# Patient Record
Sex: FEMALE | Race: WHITE | NOT HISPANIC OR LATINO
[De-identification: names, ages, dates, MRNs, and addresses within clinical notes are randomized per-mention and may not be internally consistent; named-entity substitution may affect disease eponyms.]

---

## 2021-08-24 DIAGNOSIS — N60.12 DIFFUSE CYSTIC MASTOPATHY OF LEFT BREAST: ICD-10-CM

## 2021-08-24 DIAGNOSIS — N60.11 DIFFUSE CYSTIC MASTOPATHY OF LEFT BREAST: ICD-10-CM

## 2021-08-24 PROBLEM — Z00.00 ENCOUNTER FOR PREVENTIVE HEALTH EXAMINATION: Status: ACTIVE | Noted: 2021-08-24

## 2021-08-30 ENCOUNTER — APPOINTMENT (OUTPATIENT)
Dept: BREAST CENTER | Facility: CLINIC | Age: 81
End: 2021-08-30
Payer: MEDICARE

## 2021-08-30 VITALS
WEIGHT: 128 LBS | DIASTOLIC BLOOD PRESSURE: 73 MMHG | HEART RATE: 62 BPM | OXYGEN SATURATION: 98 % | HEIGHT: 64 IN | BODY MASS INDEX: 21.85 KG/M2 | SYSTOLIC BLOOD PRESSURE: 134 MMHG

## 2021-08-30 DIAGNOSIS — Z86.79 PERSONAL HISTORY OF OTHER DISEASES OF THE CIRCULATORY SYSTEM: ICD-10-CM

## 2021-08-30 DIAGNOSIS — Z12.31 ENCOUNTER FOR SCREENING MAMMOGRAM FOR MALIGNANT NEOPLASM OF BREAST: ICD-10-CM

## 2021-08-30 DIAGNOSIS — Z86.39 PERSONAL HISTORY OF OTHER ENDOCRINE, NUTRITIONAL AND METABOLIC DISEASE: ICD-10-CM

## 2021-08-30 DIAGNOSIS — Z86.000 PERSONAL HISTORY OF IN-SITU NEOPLASM OF BREAST: ICD-10-CM

## 2021-08-30 DIAGNOSIS — R92.2 INCONCLUSIVE MAMMOGRAM: ICD-10-CM

## 2021-08-30 PROCEDURE — 99213 OFFICE O/P EST LOW 20 MIN: CPT

## 2021-08-30 RX ORDER — AMLODIPINE BESYLATE 5 MG/1
TABLET ORAL
Refills: 0 | Status: ACTIVE | COMMUNITY

## 2021-08-30 RX ORDER — ROSUVASTATIN CALCIUM 5 MG/1
TABLET, FILM COATED ORAL
Refills: 0 | Status: ACTIVE | COMMUNITY

## 2021-08-30 RX ORDER — RALOXIFENE HYDROCHLORIDE 60 MG/1
TABLET ORAL
Refills: 0 | Status: ACTIVE | COMMUNITY

## 2021-08-30 NOTE — HISTORY OF PRESENT ILLNESS
[FreeTextEntry1] : Left breast LCIS\par \par Patient denies any breast masses or nipple discharge.  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  Patient has no family history of breast cancer and is never taken hormone replacement therapy.

## 2021-08-30 NOTE — PAST MEDICAL HISTORY
[Menarche Age ____] : age at menarche was [unfilled] [History of Hormone Replacement Treatment] : has a history of hormone replacement treatment [Approximately ___] : the LMP was approximately [unfilled] [Total Preg ___] : G[unfilled] [Live Births ___] : P[unfilled]  [Age At Live Birth ___] : Age at live birth: [unfilled]

## 2021-09-03 ENCOUNTER — NON-APPOINTMENT (OUTPATIENT)
Age: 81
End: 2021-09-03

## 2022-11-13 ENCOUNTER — RESULT REVIEW (OUTPATIENT)
Age: 82
End: 2022-11-13

## 2023-11-15 ENCOUNTER — APPOINTMENT (OUTPATIENT)
Dept: OPHTHALMOLOGY | Facility: CLINIC | Age: 83
End: 2023-11-15

## 2023-11-15 ENCOUNTER — OUTPATIENT (OUTPATIENT)
Dept: OUTPATIENT SERVICES | Facility: HOSPITAL | Age: 83
LOS: 1 days | End: 2023-11-15

## 2023-11-17 DIAGNOSIS — H26.492 OTHER SECONDARY CATARACT, LEFT EYE: ICD-10-CM

## 2024-11-20 PROBLEM — M79.645 PAIN OF FINGER OF LEFT HAND: Status: ACTIVE | Noted: 2024-11-20

## 2024-11-25 RX ORDER — ACETAMINOPHEN 500 MG
TABLET ORAL DAILY
COMMUNITY

## 2024-11-25 RX ORDER — RALOXIFENE HYDROCHLORIDE 60 MG/1
60 TABLET, FILM COATED ORAL DAILY
COMMUNITY

## 2024-11-25 RX ORDER — CANDESARTAN 8 MG/1
8 TABLET ORAL 2 TIMES DAILY
COMMUNITY

## 2024-11-25 RX ORDER — LANOLIN ALCOHOL/MO/W.PET/CERES
1000 CREAM (GRAM) TOPICAL DAILY
COMMUNITY

## 2024-11-25 RX ORDER — AMLODIPINE BESYLATE 5 MG/1
5 TABLET ORAL DAILY
COMMUNITY

## 2024-11-25 RX ORDER — LATANOPROST 50 UG/ML
1 SOLUTION/ DROPS OPHTHALMIC NIGHTLY
COMMUNITY

## 2024-12-03 ENCOUNTER — APPOINTMENT (OUTPATIENT)
Dept: OTHER | Facility: CLINIC | Age: 84
End: 2024-12-03
Payer: COMMERCIAL

## 2024-12-03 SDOH — ECONOMIC STABILITY: INCOME INSECURITY: HOW HARD IS IT FOR YOU TO PAY FOR THE VERY BASICS LIKE FOOD, HOUSING, MEDICAL CARE, AND HEATING?: NOT HARD AT ALL

## 2024-12-03 SDOH — ECONOMIC STABILITY: FOOD INSECURITY: WITHIN THE PAST 12 MONTHS, THE FOOD YOU BOUGHT JUST DIDN'T LAST AND YOU DIDN'T HAVE MONEY TO GET MORE.: NEVER TRUE

## 2024-12-03 SDOH — ECONOMIC STABILITY: HOUSING INSECURITY: IN THE PAST 12 MONTHS, HOW MANY TIMES HAVE YOU MOVED WHERE YOU WERE LIVING?: 0

## 2024-12-03 SDOH — ECONOMIC STABILITY: INCOME INSECURITY: IN THE LAST 12 MONTHS, WAS THERE A TIME WHEN YOU WERE NOT ABLE TO PAY THE MORTGAGE OR RENT ON TIME?: NO

## 2024-12-03 SDOH — HEALTH STABILITY: MENTAL HEALTH
STRESS IS WHEN SOMEONE FEELS TENSE, NERVOUS, ANXIOUS, OR CAN'T SLEEP AT NIGHT BECAUSE THEIR MIND IS TROUBLED. HOW STRESSED ARE YOU?: ONLY A LITTLE

## 2024-12-03 SDOH — HEALTH STABILITY: PHYSICAL HEALTH: ON AVERAGE, HOW MANY DAYS PER WEEK DO YOU ENGAGE IN MODERATE TO STRENUOUS EXERCISE (LIKE A BRISK WALK)?: 4 DAYS

## 2024-12-03 SDOH — ECONOMIC STABILITY: HOUSING INSECURITY: AT ANY TIME IN THE PAST 12 MONTHS, WERE YOU HOMELESS OR LIVING IN A SHELTER (INCLUDING NOW)?: NO

## 2024-12-03 SDOH — HEALTH STABILITY: PHYSICAL HEALTH
HOW OFTEN DO YOU NEED TO HAVE SOMEONE HELP YOU WHEN YOU READ INSTRUCTIONS, PAMPHLETS, OR OTHER WRITTEN MATERIAL FROM YOUR DOCTOR OR PHARMACY?: RARELY

## 2024-12-03 SDOH — HEALTH STABILITY: PHYSICAL HEALTH: ON AVERAGE, HOW MANY MINUTES DO YOU ENGAGE IN EXERCISE AT THIS LEVEL?: 40 MIN

## 2024-12-03 SDOH — ECONOMIC STABILITY: TRANSPORTATION INSECURITY
IN THE PAST 12 MONTHS, HAS THE LACK OF TRANSPORTATION KEPT YOU FROM MEDICAL APPOINTMENTS OR FROM GETTING MEDICATIONS?: NO

## 2024-12-03 SDOH — ECONOMIC STABILITY: TRANSPORTATION INSECURITY
IN THE PAST 12 MONTHS, HAS LACK OF TRANSPORTATION KEPT YOU FROM MEETINGS, WORK, OR FROM GETTING THINGS NEEDED FOR DAILY LIVING?: NO

## 2024-12-03 SDOH — ECONOMIC STABILITY: FOOD INSECURITY: WITHIN THE PAST 12 MONTHS, YOU WORRIED THAT YOUR FOOD WOULD RUN OUT BEFORE YOU GOT MONEY TO BUY MORE.: NEVER TRUE

## 2024-12-03 ASSESSMENT — SOCIAL DETERMINANTS OF HEALTH (SDOH)
IN THE PAST 12 MONTHS, HAS THE ELECTRIC, GAS, OIL, OR WATER COMPANY THREATENED TO SHUT OFF SERVICE IN YOUR HOME?: NO
HOW OFTEN DO YOU ATTENT MEETINGS OF THE CLUB OR ORGANIZATION YOU BELONG TO?: NEVER
HOW OFTEN DO YOU GET TOGETHER WITH FRIENDS OR RELATIVES?: MORE THAN THREE TIMES A WEEK
IN A TYPICAL WEEK, HOW MANY TIMES DO YOU TALK ON THE PHONE WITH FAMILY, FRIENDS, OR NEIGHBORS?: MORE THAN THREE TIMES A WEEK
DO YOU BELONG TO ANY CLUBS OR ORGANIZATIONS SUCH AS CHURCH GROUPS UNIONS, FRATERNAL OR ATHLETIC GROUPS, OR SCHOOL GROUPS?: NO
HOW OFTEN DO YOU GET TOGETHER WITH FRIENDS OR RELATIVES?: MORE THAN THREE TIMES A WEEK
HOW OFTEN DO YOU ATTEND CHURCH OR RELIGIOUS SERVICES?: 1 TO 4 TIMES PER YEAR
HOW OFTEN DO YOU ATTENT MEETINGS OF THE CLUB OR ORGANIZATION YOU BELONG TO?: NEVER
IN A TYPICAL WEEK, HOW MANY TIMES DO YOU TALK ON THE PHONE WITH FAMILY, FRIENDS, OR NEIGHBORS?: MORE THAN THREE TIMES A WEEK
IN THE PAST 12 MONTHS, HAS THE ELECTRIC, GAS, OIL, OR WATER COMPANY THREATENED TO SHUT OFF SERVICE IN YOUR HOME?: NO
DO YOU BELONG TO ANY CLUBS OR ORGANIZATIONS SUCH AS CHURCH GROUPS UNIONS, FRATERNAL OR ATHLETIC GROUPS, OR SCHOOL GROUPS?: NO
HOW OFTEN DO YOU ATTEND CHURCH OR RELIGIOUS SERVICES?: 1 TO 4 TIMES PER YEAR

## 2024-12-03 ASSESSMENT — ANXIETY QUESTIONNAIRES
7. FEELING AFRAID AS IF SOMETHING AWFUL MIGHT HAPPEN: NOT AT ALL
6. BECOMING EASILY ANNOYED OR IRRITABLE: NOT AT ALL
6. BECOMING EASILY ANNOYED OR IRRITABLE: NOT AT ALL
2. NOT BEING ABLE TO STOP OR CONTROL WORRYING: NOT AT ALL
4. TROUBLE RELAXING: NOT AT ALL
2. NOT BEING ABLE TO STOP OR CONTROL WORRYING: NOT AT ALL
3. WORRYING TOO MUCH ABOUT DIFFERENT THINGS: NOT AT ALL
7. FEELING AFRAID AS IF SOMETHING AWFUL MIGHT HAPPEN: NOT AT ALL
3. WORRYING TOO MUCH ABOUT DIFFERENT THINGS: NOT AT ALL
1. FEELING NERVOUS, ANXIOUS, OR ON EDGE: SEVERAL DAYS
IF YOU CHECKED OFF ANY PROBLEMS ON THIS QUESTIONNAIRE, HOW DIFFICULT HAVE THESE PROBLEMS MADE IT FOR YOU TO DO YOUR WORK, TAKE CARE OF THINGS AT HOME, OR GET ALONG WITH OTHER PEOPLE: SOMEWHAT DIFFICULT
IF YOU CHECKED OFF ANY PROBLEMS ON THIS QUESTIONNAIRE, HOW DIFFICULT HAVE THESE PROBLEMS MADE IT FOR YOU TO DO YOUR WORK, TAKE CARE OF THINGS AT HOME, OR GET ALONG WITH OTHER PEOPLE: SOMEWHAT DIFFICULT
5. BEING SO RESTLESS THAT IT IS HARD TO SIT STILL: NOT AT ALL
GAD7 TOTAL SCORE: 1
5. BEING SO RESTLESS THAT IT IS HARD TO SIT STILL: NOT AT ALL
1. FEELING NERVOUS, ANXIOUS, OR ON EDGE: SEVERAL DAYS
4. TROUBLE RELAXING: NOT AT ALL

## 2024-12-03 ASSESSMENT — SLEEP AND FATIGUE QUESTIONNAIRES
FATIGUE_CAUSES_FREQUENT_PROBLEMTS: 7 STRONGLY AGREE
FATIGUE_MOST_DISABILING_SYMPTOM: 7 STRONGLY AGREE
FATIGUE_INTERFERES_RESPONSIBILITIES: 2
MY FATIGUE PREVENTS SUSTAINED PHYSICAL FUNCTIONING.: 6
VISUAL ANALOGUE FATIGUE SCALE (VAFS): 4
MY MOTIVATION IS LOWER WHEN I AM FATIGUED.: 3
FATIGUE_INTERFERES_RESPONSIBILITIES: 2
MY FATIGUE PREVENTS SUSTAINED PHYSICAL FUNCTIONING.: 6
EXERCISE_BRINGS_ON_FATIGUE: 5
FATIGUE_INTERFERES_PHYSICAL_FUNCTIONING: 7 STRONGLY AGREE
EASILY_FATIGUED: 7 STRONGLY AGREE
EXERCISE_BRINGS_ON_FATIGUE: 5
FATIGUE_CAUSES_FREQUENT_PROBLEMTS: 7 STRONGLY AGREE
FATIGUE_INTERFERES_SOCIAL_LIFE: 7 STRONGLY AGREE
AVERAGE_FSS_SCORE: 5.67
FATIGUE_MOST_DISABILING_SYMPTOM: 7 STRONGLY AGREE
FATIGUE_INTERFERES_SOCIAL_LIFE: 7 STRONGLY AGREE
EASILY_FATIGUED: 7 STRONGLY AGREE
FATIGUE_INTERFERES_PHYSICAL_FUNCTIONING: 7 STRONGLY AGREE
MY MOTIVATION IS LOWER WHEN I AM FATIGUED.: 3

## 2024-12-03 ASSESSMENT — LIFESTYLE VARIABLES
USE_ALCOHOL_TO_HELP_SLEEP: NO
HOW MANY STANDARD DRINKS CONTAINING ALCOHOL DO YOU HAVE ON A TYPICAL DAY: PATIENT DOES NOT DRINK
DO_YOU_DRINK?: I DID NOT DO THIS BEFORE COVID-19
HOW OFTEN DO YOU HAVE 6 OR MORE DRINKS ON ONE OCCASION: NEVER
HOW MANY STANDARD DRINKS CONTAINING ALCOHOL DO YOU HAVE ON A TYPICAL DAY: PATIENT DOES NOT DRINK
SKIP TO QUESTIONS 9-10: 1
HOW OFTEN DO YOU HAVE A DRINK CONTAINING ALCOHOL: NEVER
HOW OFTEN DO YOU HAVE A DRINK CONTAINING ALCOHOL: NEVER
HOW OFTEN DO YOU HAVE SIX OR MORE DRINKS ON ONE OCCASION: NEVER
AUDIT-C TOTAL SCORE: 0

## 2024-12-04 ENCOUNTER — TELEMEDICINE CLINICAL SUPPORT (OUTPATIENT)
Dept: OTHER | Facility: CLINIC | Age: 84
End: 2024-12-04
Payer: COMMERCIAL

## 2024-12-04 DIAGNOSIS — Z86.16 PERSONAL HISTORY OF COVID-19: ICD-10-CM

## 2024-12-04 ASSESSMENT — MONTREAL COGNITIVE ASSESSMENT (MOCA)
7. [VIGILENCE] TAP WHEN HEARING DESIGNATED LETTER: 1
4. NAME EACH OF THE THREE ANIMALS SHOWN: 0
10. [FLUENCY] NAME WORDS STARTING WITH DESIGNATED LETTER: 1
WHAT LEVEL OF EDUCATION WAS ATTAINED: 0
5. MEMORY TRIALS: 0
9. REPEAT EACH SENTENCE: 2
13. ORIENTATION SUBSCORE: 5
WHAT IS THE TOTAL SCORE (OUT OF 30): 16
8. SERIAL SUBTRACTION OF 7S: 3
11. FOR EACH PAIR OF WORDS, WHAT CATEGORY DO THEY BELONG TO (OUT OF 2): 2
12. MEMORY INDEX SCORE: 0
6. READ LIST OF DIGITS [FORWARD/BACKWARD]: 2
VISUOSPATIAL/EXECUTIVE SUBSCORE: 0

## 2024-12-04 NOTE — PROGRESS NOTES
NPV In-Person    Subjective   COVID-19 Infection Date:  January 2022  PCR confirmed (sx: (Patient-Rptd) Fever, Fatigue, Headache, Sore throat, Cough, Muscle pain, Runny nose  - no hospitalization, no treatment)    COVID-19 vaccine status: (Patient-Rptd) Pfizer     Occupation: retired     Current Providers: PCP Dr. Leiva ([facility] in [place]), Rheumatology Dr. Samuels, Dermatology Dr. Jara ([facility] in [place]), (Patient-Rptd) Dr. Zack Ernst - eye surgeon     Survey Scores: 12/2024  PHQ-9: 4   TRENT-7: 1   Sleep Wellness: 15   FSS average: 5.67   Modified Ecog average: 1.08   MOCA: 16/22 (12/2024)  Overall Health: 80     84 y.o. female with a h/o COVID-19 as noted above, HTN, osteoporosis, prediabetes, MDD, CKD, HLD, pancreatic cyst, presents to [facility] at [facility] with c/o Muscle pain in arms and thighs, fatigue, memory changes, intermittent itching, increased anxiety and depression, mild shortness of breath when climbing stairs, new intermittent mild lower extremity edema.     Itching is not as bad as it used to be but still occurs, still once or twice per day, moves around, present for a few minutes  No rash, tried second generation antihistamine, may have helped a little, did not take this regularly  No seasonal allergies except for fall of 2023 she had lots of congestion and what looked like seasonal allergies, again a bit this visit  Took Zyrtec once or twice  No hives typically but recalls that are long time ago had hives after eating strawberries  No fevers or chills  no bruising or bleeding  Upper arms muscle aches and thighs, present most of the time, no exacerbating factors, no alleviating factors  Daughter notes that muscle aches started right with COVID  Advil helps a little bit, not taking regularly, heat makes no difference, not palpable  Not activity limiting but still bothersome  No joint pain, has back pain due to arthritis  Recently started to have some edema, maybe related  to amlodipine, noticed after airplane ride, not every day  Has been on amlodipine for a long time but dose was increased recently   Otherwise no edema  Memory is a little less than it used to be, unsure when she first noticed, right after she had COVID had significant brain fog  Baseline memory was very sharp per daughter  Memorey is worse when fatigue is worse, this has been bothersome  Fatigue is present all the time, unsure when that started, so tired can fall asleep anytime  Has to rest after activities, dozing off when sitting down, that is new  Napping 2-5 hours every day, baseline  Sleeping well at night, around 7 hours, not refreshed in the morning  Snores and has so for a long time, no apnea  Had a sleep study a long time ago  No new or unusual headaches, no vision changes that are unusual, following closely with optometry and ophthalmology  Hearing changes, has hearing aids and now using them more  No tinnitus, no smell or taste changes  No sinus problems, no dry mouth or dry eyes, no dysphagia  No GI symptoms, sometimes constipated and then eats prunes, sometimes diarrhea, no stomach pains, no  complaints  When walking up a flight of stairs then feels a bit SOB, occasional cough  Exercisiingg regularly, not getting winded then, swimming, yoga, pilates, floor exercises  No PND, no orthopnea  No chest pain, no palpitations or heart racing, last night but more so anxiety  Has noted more anxiety and depression  Took Lexapro a long time ago, stopped it recently (a few months ago), unsure what dose, was on it for a long time  Did not notice that anxiety or depression increased with stopping the Lexapro  Uses distraction to help her during times of anxiety  No numbness or tingling  No night sweats, no temperature irregularities, more sensitive to cold, likes it warmer  Rarely gets dizzy, last night when anxiety was present  Anxiety was present before dizziness and palpitations, lasted maybe an hour  Eating  well, weight is stable  Taking Meloxicam right now and not helping with arm and thigh pain  Cymbalta was tried previously at 30 and 60mg dose and did not help with pain  Daughter inquiring about low dose of naltrexone and/or small doses of adderall    full ROS completed and all negative unless noted in HPI     Relevant prior healthcare visits:  -11/2024 Dermatology notes recurrent itching is mild and patient declined Rx  -11/2024 PCP notes holding simvastatin did not improve muscle pain, doctors in [facility] recommend myositis panel, follows with urology for urine cytology abnormality   -10/2023 Rheumatology notes that likelihood of rheumatologic disease is low, plan for imaging to confirm, possible statin-induced myalgia so holding atorvastatin, consider starting prednisone 20mg daily x5 days after eye surgery to see if pain improves    Relevant prior diagnostic studies:  -11/2024 CXR unremarkable   -11/2024 US pelvis shows stable thickened heterogeneous echogenicity of the endometrium  -07/2024 Fibroscan consistent with mild fatty liver disease  -11/2023 US abdomen shows cholelithiasis, otherwise unremarkable   -10/2023 MRI Right humerus shows moderate amount of fluid in the subacromial-subdeltoid bursa, correlate for bursitis    Relevant prior laboratory values, unremarkable unless noted:  -11/2024 PM-Scl Ab, BRIONNA, CRP 57.7, ESR, CK, Vitamin D, TSH, HbA1c 5.8%, BMP, liver function test with ALT 35, lipids, CBC/D,   -09/2024 hepatic function, CPK, GGT, LDH, 24hr urine, CBC/D, SPEP with increase in Gamma, CRP 5, Retics  -06/2024 IGG slightly elevated, IGA, IGM, ceruloplasmin, alpha-1-antitrypsin  -05/2024 cortisol, T4, TSH, HIV, HCV, Treponema pall ab, stool cultures, thyroglobulin ab, anti TPO ab, CMV IGM, CMV IGG positive, EBV IGG positive, Anti HB core, P-ANCA, C-ANCA, AMA, ASMA, vasculitis provile, Q Fever IGG, BRIONNA, KENYATTA  -02/2024 iron low, Ferritin 440, folic acid, vitamin B12, HbA1c 5.8%  -11/2023 Vitamin B12,  "Folate    Exercise routine: 40 minutes 4 days per week  Diet:  Weight hx: pre-COVID-19 (Patient-Rptd) 130  lbs -> post-COVID-19 (Patient-Rptd) 130  lbs  Substance use: no tobacco use hx, 0 servings ETOH   Social:       Current Outpatient Medications:     amLODIPine (Norvasc) 5 mg tablet, Take 1 tablet (5 mg) by mouth once daily., Disp: , Rfl:     calcium carbonate/vitamin D3 (CALCIUM 600 + D,3, ORAL), Take by mouth., Disp: , Rfl:     candesartan (Atacand) 8 mg tablet, Take 1 tablet (8 mg) by mouth 2 times a day., Disp: , Rfl:     cholecalciferol (D3-2000) 50 mcg (2,000 unit) capsule, Take by mouth once daily., Disp: , Rfl:     cyanocobalamin (Vitamin B-12) 1,000 mcg tablet, Take 1 tablet (1,000 mcg) by mouth once daily., Disp: , Rfl:     latanoprost (Xalatan) 0.005 % ophthalmic solution, 1 drop once daily at bedtime., Disp: , Rfl:     multivitamin with minerals iron-free (Centrum Silver), Take 1 tablet by mouth once daily., Disp: , Rfl:     NON FORMULARY, Take 1 each by mouth once daily. Bilastine 20 MG PRN, Disp: , Rfl:     raloxifene (Evista) 60 mg tablet, Take 1 tablet (60 mg) by mouth once daily., Disp: , Rfl:     naltrexone 4.5 mg capsule, Take 4.5 mg by mouth once daily., Disp: 180 capsule, Rfl: 0    History reviewed. No pertinent past medical history.    History reviewed. No pertinent surgical history.    No family history on file.    Objective   /71 (BP Location: Right arm, Patient Position: Sitting, BP Cuff Size: Adult)   Pulse 72   Temp 36.3 °C (97.3 °F) (Temporal)   Ht 1.575 m (5' 2\")   Wt 58.1 kg (128 lb)   SpO2 98%   BMI 23.41 kg/m²     Physical Exam  Vitals reviewed.   Constitutional:       Appearance: Normal appearance.   HENT:      Mouth/Throat:      Mouth: Mucous membranes are moist.   Eyes:      Conjunctiva/sclera: Conjunctivae normal.   Cardiovascular:      Rate and Rhythm: Normal rate and regular rhythm.      Heart sounds: Normal heart sounds.   Pulmonary:      Effort: " Pulmonary effort is normal.      Breath sounds: Normal breath sounds.   Abdominal:      General: Bowel sounds are normal.      Palpations: Abdomen is soft.   Musculoskeletal:         General: Normal range of motion.      Cervical back: Neck supple.   Skin:     General: Skin is warm and dry.   Neurological:      General: No focal deficit present.   Psychiatric:         Mood and Affect: Mood normal.         Cognition and Memory: Cognition normal.         Assessment/Plan   Problem List Items Addressed This Visit             ICD-10-CM       High    Long COVID - Primary U09.9     12/2024:  Muscle pain in arms and thighs, fatigue, memory changes, intermittent itching, increased anxiety and depression, mild shortness of breath when climbing stairs, new intermittent mild lower extremity edema  -additional/repeat bloodwork orders placed  -I recommend a sleep study to rule out sleep apnea as a cause/contributing to your symptoms  -given shortness of breath on exertion and mild intermittent edema, an echocardiogram of your heart is recommended  -trial LDN sent to [facility], discussed that this is not FDA approved for Long COVID treatment at this time, more information on LDN can be found at [email or web address]   -supplements you may consider trying:  --try Pure Encapsulation Magnesium glycinate, 120mg, 1-2 caps with the last meal of the day   --Brain Gain Luteolin  -there is growing research supporting the use of stimulants to help with long COVID symptoms of fatigue and memory changes, example medications are Modafinil (100-200mg daily or Adderall XR 10mg daily). This is something you could consider exploring with your physicians if no improvement with LDN.  -we often use Speech Therapy and/or Occupational Therapy for cognitive rehabilitation and fatigue management training  -consider restarting SSRI for anxiety and depression, discuss with your local providers. Also use grounding and mindfulness tools to help  "with anxiety, a recommended resource is the smartphone irwin \"Unwinding Anxiety\"  -for itching, I recommend taking a seccond generation antihistamine daily (Zyrtec, for example). Try this for two weeks and let me know if it helps prevent itching.         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

## 2024-12-06 ENCOUNTER — DOCUMENTATION WITH CHARGES (OUTPATIENT)
Dept: OTHER | Facility: CLINIC | Age: 84
End: 2024-12-06
Payer: COMMERCIAL

## 2024-12-06 NOTE — PROGRESS NOTES
prolonged patient services without direct patient contact involved reviewing medical records including provider notes, diagnostic results, and patient surveys. Total time 100 minutes

## 2024-12-09 ENCOUNTER — TELEPHONE (OUTPATIENT)
Dept: OTHER | Facility: CLINIC | Age: 84
End: 2024-12-09

## 2024-12-09 ENCOUNTER — LAB (OUTPATIENT)
Dept: LAB | Facility: LAB | Age: 84
End: 2024-12-09
Payer: MEDICARE

## 2024-12-09 ENCOUNTER — OFFICE VISIT (OUTPATIENT)
Dept: OTHER | Facility: CLINIC | Age: 84
End: 2024-12-09
Payer: MEDICARE

## 2024-12-09 VITALS
WEIGHT: 128 LBS | DIASTOLIC BLOOD PRESSURE: 71 MMHG | BODY MASS INDEX: 23.55 KG/M2 | TEMPERATURE: 97.3 F | SYSTOLIC BLOOD PRESSURE: 135 MMHG | OXYGEN SATURATION: 98 % | HEIGHT: 62 IN | HEART RATE: 72 BPM

## 2024-12-09 DIAGNOSIS — U09.9 POST COVID-19 CONDITION, UNSPECIFIED: Primary | ICD-10-CM

## 2024-12-09 DIAGNOSIS — U09.9 POST-ACUTE SEQUELAE OF COVID-19 (PASC): ICD-10-CM

## 2024-12-09 DIAGNOSIS — U09.9 LONG COVID: ICD-10-CM

## 2024-12-09 DIAGNOSIS — R06.09 DYSPNEA ON EXERTION: ICD-10-CM

## 2024-12-09 DIAGNOSIS — U09.9 LONG COVID: Primary | ICD-10-CM

## 2024-12-09 LAB
ALBUMIN SERPL BCP-MCNC: 4.1 G/DL (ref 3.4–5)
ALP SERPL-CCNC: 92 U/L (ref 33–136)
ALT SERPL W P-5'-P-CCNC: 47 U/L (ref 7–45)
ANION GAP SERPL CALC-SCNC: 10 MMOL/L (ref 10–20)
AST SERPL W P-5'-P-CCNC: 34 U/L (ref 9–39)
BASOPHILS # BLD AUTO: 0.05 X10*3/UL (ref 0–0.1)
BASOPHILS NFR BLD AUTO: 0.6 %
BILIRUB SERPL-MCNC: 0.5 MG/DL (ref 0–1.2)
BNP SERPL-MCNC: 69 PG/ML (ref 0–99)
BUN SERPL-MCNC: 22 MG/DL (ref 6–23)
CALCIUM SERPL-MCNC: 9.9 MG/DL (ref 8.6–10.3)
CHLORIDE SERPL-SCNC: 108 MMOL/L (ref 98–107)
CO2 SERPL-SCNC: 28 MMOL/L (ref 21–32)
CREAT SERPL-MCNC: 1.14 MG/DL (ref 0.5–1.05)
CRP SERPL-MCNC: 0.36 MG/DL
EGFRCR SERPLBLD CKD-EPI 2021: 47 ML/MIN/1.73M*2
EOSINOPHIL # BLD AUTO: 0.19 X10*3/UL (ref 0–0.4)
EOSINOPHIL NFR BLD AUTO: 2.1 %
ERYTHROCYTE [DISTWIDTH] IN BLOOD BY AUTOMATED COUNT: 13.4 % (ref 11.5–14.5)
ERYTHROCYTE [SEDIMENTATION RATE] IN BLOOD BY WESTERGREN METHOD: 5 MM/H (ref 0–30)
FOLATE SERPL-MCNC: 22.3 NG/ML
GLUCOSE SERPL-MCNC: 95 MG/DL (ref 74–99)
HCT VFR BLD AUTO: 41.3 % (ref 36–46)
HGB BLD-MCNC: 13.5 G/DL (ref 12–16)
IMM GRANULOCYTES # BLD AUTO: 0.03 X10*3/UL (ref 0–0.5)
IMM GRANULOCYTES NFR BLD AUTO: 0.3 % (ref 0–0.9)
LYMPHOCYTES # BLD AUTO: 2.8 X10*3/UL (ref 0.8–3)
LYMPHOCYTES NFR BLD AUTO: 31 %
MAGNESIUM SERPL-MCNC: 2.23 MG/DL (ref 1.6–2.4)
MCH RBC QN AUTO: 29.5 PG (ref 26–34)
MCHC RBC AUTO-ENTMCNC: 32.7 G/DL (ref 32–36)
MCV RBC AUTO: 90 FL (ref 80–100)
MONOCYTES # BLD AUTO: 0.74 X10*3/UL (ref 0.05–0.8)
MONOCYTES NFR BLD AUTO: 8.2 %
NEUTROPHILS # BLD AUTO: 5.22 X10*3/UL (ref 1.6–5.5)
NEUTROPHILS NFR BLD AUTO: 57.8 %
NRBC BLD-RTO: 0 /100 WBCS (ref 0–0)
PLATELET # BLD AUTO: 244 X10*3/UL (ref 150–450)
POTASSIUM SERPL-SCNC: 5.2 MMOL/L (ref 3.5–5.3)
PROT SERPL-MCNC: 7.5 G/DL (ref 6.4–8.2)
PROT SERPL-MCNC: 7.8 G/DL (ref 6.4–8.2)
RBC # BLD AUTO: 4.58 X10*6/UL (ref 4–5.2)
SODIUM SERPL-SCNC: 141 MMOL/L (ref 136–145)
VIT B12 SERPL-MCNC: 1029 PG/ML (ref 211–911)
WBC # BLD AUTO: 9 X10*3/UL (ref 4.4–11.3)

## 2024-12-09 PROCEDURE — 1036F TOBACCO NON-USER: CPT | Performed by: NURSE PRACTITIONER

## 2024-12-09 PROCEDURE — 1160F RVW MEDS BY RX/DR IN RCRD: CPT | Performed by: NURSE PRACTITIONER

## 2024-12-09 PROCEDURE — 84425 ASSAY OF VITAMIN B-1: CPT

## 2024-12-09 PROCEDURE — 85025 COMPLETE CBC W/AUTO DIFF WBC: CPT

## 2024-12-09 PROCEDURE — 83880 ASSAY OF NATRIURETIC PEPTIDE: CPT

## 2024-12-09 PROCEDURE — 1126F AMNT PAIN NOTED NONE PRSNT: CPT | Performed by: NURSE PRACTITIONER

## 2024-12-09 PROCEDURE — 83520 IMMUNOASSAY QUANT NOS NONAB: CPT

## 2024-12-09 PROCEDURE — 83735 ASSAY OF MAGNESIUM: CPT

## 2024-12-09 PROCEDURE — 1159F MED LIST DOCD IN RCRD: CPT | Performed by: NURSE PRACTITIONER

## 2024-12-09 PROCEDURE — 36415 COLL VENOUS BLD VENIPUNCTURE: CPT

## 2024-12-09 PROCEDURE — G2211 COMPLEX E/M VISIT ADD ON: HCPCS | Performed by: NURSE PRACTITIONER

## 2024-12-09 PROCEDURE — 82607 VITAMIN B-12: CPT

## 2024-12-09 PROCEDURE — 84165 PROTEIN E-PHORESIS SERUM: CPT

## 2024-12-09 PROCEDURE — 99215 OFFICE O/P EST HI 40 MIN: CPT | Performed by: NURSE PRACTITIONER

## 2024-12-09 PROCEDURE — 84207 ASSAY OF VITAMIN B-6: CPT

## 2024-12-09 PROCEDURE — 99205 OFFICE O/P NEW HI 60 MIN: CPT | Performed by: NURSE PRACTITIONER

## 2024-12-09 PROCEDURE — 82746 ASSAY OF FOLIC ACID SERUM: CPT

## 2024-12-09 PROCEDURE — 86140 C-REACTIVE PROTEIN: CPT

## 2024-12-09 PROCEDURE — 84165 PROTEIN E-PHORESIS SERUM: CPT | Performed by: NURSE PRACTITIONER

## 2024-12-09 PROCEDURE — 80053 COMPREHEN METABOLIC PANEL: CPT

## 2024-12-09 PROCEDURE — 85652 RBC SED RATE AUTOMATED: CPT

## 2024-12-09 PROCEDURE — 84155 ASSAY OF PROTEIN SERUM: CPT

## 2024-12-09 PROCEDURE — G2212 PROLONG OUTPT/OFFICE VIS: HCPCS | Performed by: NURSE PRACTITIONER

## 2024-12-09 ASSESSMENT — PAIN SCALES - GENERAL: PAINLEVEL_OUTOF10: 0-NO PAIN

## 2024-12-09 NOTE — PATIENT INSTRUCTIONS
"It was my pleasure seeing you in the [facility] today.  We will focus on addressing the following symptoms discussed today: Muscle pain in arms and thighs, fatigue, memory changes, intermittent itching, increased anxiety and depression, mild shortness of breath when climbing stairs, new intermittent mild lower extremity edema    My recommendations are as follows:  -additional/repeat bloodwork orders placed  -I recommend a sleep study to rule out sleep apnea as a cause/contributing to your symptoms  -given shortness of breath on exertion and mild intermittent edema, an echocardiogram of your heart is recommended  -trial LDN sent to [facility], discussed that this is not FDA approved for Long COVID treatment at this time, more information on LDN can be found at [email or web address]   -supplements you may consider trying:  --try Pure Encapsulation Magnesium glycinate, 120mg, 1-2 caps with the last meal of the day   --Brain Gain Luteolin  -there is growing research supporting the use of stimulants to help with long COVID symptoms of fatigue and memory changes, example medications are Modafinil (100-200mg daily or Adderall XR 10mg daily). This is something you could consider exploring with your physicians if no improvement with LDN.  -we often use Speech Therapy and/or Occupational Therapy for cognitive rehabilitation and fatigue management training  -consider restarting SSRI for anxiety and depression, discuss with your local providers. Also use grounding and mindfulness tools to help with anxiety, a recommended resource is the smartphone irwin \"Unwinding Anxiety\"  -for itching, I recommend taking a seccond generation antihistamine daily (Zyrtec, for example). Try this for two weeks and let me know if it helps prevent itching.    Tips to help improve brain fog and fatigue:  --avoid drinking Alcohol while recovering from Long COVID  --Focus on eating whole foods to help support your gut and immune " system. Aim to eat 30 different plants per week (vegetables, fruits, beans, nuts, legumes, seeds, whole grains, herbs, spices). Avoid processed foods and beverages. Eliminate added sugars, artificial sweeteners, processed oils, artificial dyes.  --ensure to practice 30 minutes of exercise 7 days per week to keep BNDGF (brain derived neurotrophic growth factor) elevated as this will help in the regeneration of neurons, you may split exercise time up into 5 minute increments if this is better tolerated.   --slowly increase your activity by no more than 10% per week, rest when you feel tired  --utilize pacing techniques to manage fatigue, schedule rest times throughout the day so you do not run out of energy, more information to be found on this here: [email or web address]  --use the “attention beam” strategy to help you focus on some things while ignoring others. Imagine a flashlight beam illuminating the task that you are trying to focus on while leaving everything else in the dark.  --minimize external distractions to help with attention. For example, turn off the TV, radio, music, heater, and other electrical equipment, and close the window to screen out traffic noise. Complete important or difficult tasks in a quiet room if possible. Switch off mobile phones, switch off automatic email notifications. Use ear plugs if necessary or noise-cancelling headphones. Reduce visual distractions by clearing off your work-space, sit opposite to a window. Make sure lighting in the workspace is adequate.  --minimize internal distractions to help with attention. Thoughts, feelings, and physical sensations such as hunger or pain can be distracting. When you notice your attention beam is directed toward a thought or sensation, gently direct your attention back to the central focal point. You can also practice mindfulness and/or meditation to help reduce internal  "distractions  --games that can be tried to help improve memory and attention are Brain HQ and N-Back games  --mindfulness and meditation can be learned with the smartphone apps “Unwinding Anxiety” and “Headspace”  --Review the  Health Talk on Managing Fatigue and Thinking Changes after COVID-19 here: [email or web address]/managing-fatigue-and-thinking-changes-after-covid-19  --You can also find many helpful tips and tricks in this book: \"The Long COVID self-help guide, practical ways to manage symptoms\" by [employer] [facility] from the [facility] [place]     We will send a message in [employer] or call you with the results of your tests.  Further recommendations will follow based on testing results and your symptoms.   Please return to [facility] in 6 months, call [phone] or send a message through your [employer] irwin if needed.    Please also consider attending  [facility] support group for long-COVID and post-ICU patients. To contact support group, email [email or web address] or call [phone]   "

## 2024-12-09 NOTE — ASSESSMENT & PLAN NOTE
"12/2024:  Muscle pain in arms and thighs, fatigue, memory changes, intermittent itching, increased anxiety and depression, mild shortness of breath when climbing stairs, new intermittent mild lower extremity edema  -additional/repeat bloodwork orders placed  -I recommend a sleep study to rule out sleep apnea as a cause/contributing to your symptoms  -given shortness of breath on exertion and mild intermittent edema, an echocardiogram of your heart is recommended  -trial LDN sent to [facility], discussed that this is not FDA approved for Long COVID treatment at this time, more information on LDN can be found at www.[email or web address]   -supplements you may consider trying:  --try Pure Encapsulation Magnesium glycinate, 120mg, 1-2 caps with the last meal of the day   --Brain Gain Luteolin  -there is growing research supporting the use of stimulants to help with long COVID symptoms of fatigue and memory changes, example medications are Modafinil (100-200mg daily or Adderall XR 10mg daily). This is something you could consider exploring with your physicians if no improvement with LDN.  -we often use Speech Therapy and/or Occupational Therapy for cognitive rehabilitation and fatigue management training  -consider restarting SSRI for anxiety and depression, discuss with your local providers. Also use grounding and mindfulness tools to help with anxiety, a recommended resource is the smartphone irwin \"Unwinding Anxiety\"  -for itching, I recommend taking a seccond generation antihistamine daily (Zyrtec, for example). Try this for two weeks and let me know if it helps prevent itching.  "

## 2024-12-09 NOTE — TELEPHONE ENCOUNTER
Betito from the  lab called with a cancelled lab for the plasma histamine.  It was improperly collected and a new order will need to place along with a second draw.

## 2024-12-10 LAB — TRYPTASE SERPL-MCNC: 6 UG/L

## 2024-12-10 PROCEDURE — 83088 ASSAY OF HISTAMINE: CPT

## 2024-12-10 PROCEDURE — 36415 COLL VENOUS BLD VENIPUNCTURE: CPT

## 2024-12-11 LAB
ALBUMIN: 4.2 G/DL (ref 3.4–5)
ALPHA 1 GLOBULIN: 0.4 G/DL (ref 0.2–0.6)
ALPHA 2 GLOBULIN: 0.7 G/DL (ref 0.4–1.1)
BETA GLOBULIN: 0.9 G/DL (ref 0.5–1.2)
GAMMA GLOBULIN: 1.6 G/DL (ref 0.5–1.4)
PATH REVIEW-SERUM PROTEIN ELECTROPHORESIS: ABNORMAL
PROTEIN ELECTROPHORESIS COMMENT: ABNORMAL
PYRIDOXAL PHOS SERPL-SCNC: 42.2 NMOL/L (ref 20–125)
VIT B1 PYROPHOSHATE BLD-SCNC: 131 NMOL/L (ref 70–180)

## 2024-12-13 LAB — HISTAMINE SERPL-SCNC: 8 NMOL/L (ref 0–8)

## 2025-05-05 ENCOUNTER — APPOINTMENT (OUTPATIENT)
Dept: OTHER | Facility: CLINIC | Age: 85
End: 2025-05-05
Payer: MEDICARE

## 2025-05-12 ENCOUNTER — APPOINTMENT (OUTPATIENT)
Dept: OTHER | Facility: CLINIC | Age: 85
End: 2025-05-12
Payer: MEDICARE